# Patient Record
Sex: MALE | Race: WHITE | NOT HISPANIC OR LATINO
[De-identification: names, ages, dates, MRNs, and addresses within clinical notes are randomized per-mention and may not be internally consistent; named-entity substitution may affect disease eponyms.]

---

## 2019-01-08 ENCOUNTER — APPOINTMENT (OUTPATIENT)
Dept: DERMATOLOGY | Facility: CLINIC | Age: 64
End: 2019-01-08
Payer: COMMERCIAL

## 2019-01-08 DIAGNOSIS — Z78.9 OTHER SPECIFIED HEALTH STATUS: ICD-10-CM

## 2019-01-08 DIAGNOSIS — L24.5 IRRITANT CONTACT DERMATITIS DUE TO OTHER CHEMICAL PRODUCTS: ICD-10-CM

## 2019-01-08 DIAGNOSIS — Z86.79 PERSONAL HISTORY OF OTHER DISEASES OF THE CIRCULATORY SYSTEM: ICD-10-CM

## 2019-01-08 DIAGNOSIS — Z85.828 PERSONAL HISTORY OF OTHER MALIGNANT NEOPLASM OF SKIN: ICD-10-CM

## 2019-01-08 PROBLEM — Z00.00 ENCOUNTER FOR PREVENTIVE HEALTH EXAMINATION: Status: ACTIVE | Noted: 2019-01-08

## 2019-01-08 PROCEDURE — 99213 OFFICE O/P EST LOW 20 MIN: CPT

## 2019-01-08 RX ORDER — WARFARIN 5 MG/1
5 TABLET ORAL
Refills: 0 | Status: ACTIVE | COMMUNITY

## 2019-01-08 RX ORDER — HYDROCORTISONE 25 MG/G
2.5 CREAM TOPICAL
Qty: 60 | Refills: 0 | Status: ACTIVE | COMMUNITY
Start: 2018-07-07

## 2019-01-08 RX ORDER — TADALAFIL 20 MG/1
20 TABLET, FILM COATED ORAL
Qty: 18 | Refills: 0 | Status: ACTIVE | COMMUNITY
Start: 2018-08-15

## 2019-01-08 RX ORDER — FLUOCINONIDE 0.5 MG/G
0.05 CREAM TOPICAL
Qty: 1 | Refills: 1 | Status: ACTIVE | COMMUNITY
Start: 2019-01-08 | End: 1900-01-01

## 2019-01-08 NOTE — PHYSICAL EXAM
[Alert] : alert [Oriented x 3] : ~L oriented x 3 [Well Nourished] : well nourished [FreeTextEntry3] : The following areas were examined and no significant abnormalities were seen except as noted below:\par \par Type II skin\par \par scalp, face, eyelids, nose, lips, ears, neck, chest, abdomen, back,groin, buttocks, right arm, left arm, right hand, left hand,\par right  leg, left leg, right foot, left foot\par \par Face: bright erythematous patches on the inner cheeks, right greater than the left with one small bulla on the upper bridge of the nose\par Multiplet almost confluent small pustules present on the right inner cheek with a shallow crusted erosion\par Right axilla and right lower back: 8X4 mm brown verrucous plaques\par Multiple small tan macules present on the arms, chest, and upper back\par Left lower medial leg: Circular hypopigmented patch-no evidence of recurrence of the basal cell carcinoma at the site\par \par No suspicious lesions seen

## 2019-01-08 NOTE — HISTORY OF PRESENT ILLNESS
[FreeTextEntry1] : rash on face [de-identified] : Followup visit for a 63-year-old white male status post 2 night usage of 1-year-old Picato gel 0.015% to the inner cheeks and nose for actinic keratoses.  Rash is mildly sore.\par \par Patient also presents for evaluation of growths. Particularly concerned about lesions in the right axilla and right back. Has a history of basal cell carcinoma treated in the past.

## 2019-05-21 ENCOUNTER — APPOINTMENT (OUTPATIENT)
Dept: DERMATOLOGY | Facility: CLINIC | Age: 64
End: 2019-05-21
Payer: COMMERCIAL

## 2019-05-21 VITALS — HEIGHT: 72 IN | BODY MASS INDEX: 27.09 KG/M2 | WEIGHT: 200 LBS

## 2019-05-21 DIAGNOSIS — D22.5 MELANOCYTIC NEVI OF TRUNK: ICD-10-CM

## 2019-05-21 PROCEDURE — 17000 DESTRUCT PREMALG LESION: CPT

## 2019-05-21 PROCEDURE — 99213 OFFICE O/P EST LOW 20 MIN: CPT | Mod: 25

## 2019-05-21 RX ORDER — DOXYCYCLINE 40 MG/1
40 CAPSULE ORAL
Qty: 30 | Refills: 1 | Status: ACTIVE | COMMUNITY
Start: 2019-05-21 | End: 1900-01-01

## 2019-05-21 NOTE — HISTORY OF PRESENT ILLNESS
[FreeTextEntry1] : Evaluation of growths [de-identified] : Followup visit for 63-year-old white male last seen by me on January 8, 2019 presenting for evaluation of growths.  Particularly concerned about increased asyptomatic redness of the cheeks.  Patient is status post use of Picato gel to both cheeks about 5 months ago. Also, use fluocinonide cream 0.05%for irritation.  Currently using no treatment.\par Patient has history of basal cell carcinoma treated in the past.

## 2019-05-21 NOTE — PHYSICAL EXAM
[Alert] : alert [Oriented x 3] : ~L oriented x 3 [Well Nourished] : well nourished [FreeTextEntry3] : The following areas were examined and no significant abnormalities were seen except as noted below:\par \par Type II skin\par \par scalp, face, eyelids, nose, lips, ears, neck, chest, abdomen, back,groin, buttocks, right arm, left arm, right hand, left hand,\par right  leg, left leg, right foot, left foot\par \par Inner cheeks: Mild 2- 3 mm, pink papules with mild underlying erythema\par Right nose: 15 x 12 mm, erythematous, scaly patch\par Trunk:  Mild to moderate small, brown, verrucous papule/plaques.\par Moderate 2- 4 mm, dark brown macules\par Right flank: 10 x 8 mm smooth brown plaque-not suspicious on dermoscopy\par Left lower medial leg: Circular hyperpigmented patch-no evidence of recurrence of previously treated basal cell carcinoma\par \par No other suspicious lesions seen

## 2019-05-21 NOTE — ASSESSMENT
[FreeTextEntry1] : Rosacea on cheeks\par Large actinic keratosis on right nose.\par Seborrheic keratoses on trunk and elsewhere.\par Melanocytic nevi on trunk including the right flank

## 2019-06-24 ENCOUNTER — APPOINTMENT (OUTPATIENT)
Dept: DERMATOLOGY | Facility: CLINIC | Age: 64
End: 2019-06-24
Payer: COMMERCIAL

## 2019-06-24 PROCEDURE — 99213 OFFICE O/P EST LOW 20 MIN: CPT

## 2019-06-24 RX ORDER — DOXYCYCLINE HYCLATE 50 MG/1
50 CAPSULE ORAL
Qty: 30 | Refills: 0 | Status: COMPLETED | COMMUNITY
Start: 2019-05-21

## 2019-06-24 NOTE — HISTORY OF PRESENT ILLNESS
[FreeTextEntry1] : Rosacea [de-identified] : Followup visit for 63-year-old white male last seen by me on May 24, 2019, with rosacea. Started on doxycycline (uncertain if patient is on the 40 mg extended release or 50 mg capsule) and metronidazole cream 0.75% b.i.d.\par Pimples on face have improved , but patient complains of increased facial erythema when exposed to sun.\par Patient also concerned about a persistent lesion on the left forearm.

## 2019-06-24 NOTE — PHYSICAL EXAM
[Alert] : alert [Oriented x 3] : ~L oriented x 3 [Well Nourished] : well nourished [Nose] : Nose [Eyelids] : Eyelids [Ears] : Ears [Lips] : Lips [FreeTextEntry3] : Face: Essentially no papules.\par Left mid cheek: 3 x 3 mm, dull, erythematous smooth papule\par Rare small, faint papules present on the right cheek\par Nose: Clear\par left mid forearm: 3 x 3 mm, slightly elevated pink papule with a surrounding ring of purpura except southwest

## 2019-06-25 ENCOUNTER — CLINICAL ADVICE (OUTPATIENT)
Age: 64
End: 2019-06-25

## 2019-07-30 ENCOUNTER — APPOINTMENT (OUTPATIENT)
Dept: DERMATOLOGY | Facility: CLINIC | Age: 64
End: 2019-07-30
Payer: COMMERCIAL

## 2019-07-30 PROCEDURE — 99213 OFFICE O/P EST LOW 20 MIN: CPT

## 2019-07-30 NOTE — HISTORY OF PRESENT ILLNESS
[FreeTextEntry1] : Rosacea [de-identified] : Followup visit for 64-year-old white male last seen by me on June 24, 2019 with rosacea. Currently off of p.o. doxycycline. Currently using metronidazole cream 0.75% b.i.d.\par Note-patient picked up a green undercooked but has yet to try using it.

## 2019-07-30 NOTE — PHYSICAL EXAM
[Alert] : alert [Oriented x 3] : ~L oriented x 3 [Well Nourished] : well nourished [FreeTextEntry3] : Face: Mild small barely elevated papules present on the inner cheeks, especially left\par Mild to moderate ill-defined telangiectatic erythema present on both inner cheeks

## 2019-10-01 ENCOUNTER — APPOINTMENT (OUTPATIENT)
Dept: DERMATOLOGY | Facility: CLINIC | Age: 64
End: 2019-10-01
Payer: COMMERCIAL

## 2019-10-01 PROCEDURE — 99213 OFFICE O/P EST LOW 20 MIN: CPT

## 2019-10-01 NOTE — HISTORY OF PRESENT ILLNESS
[FreeTextEntry1] : Rosacea [de-identified] : Followup visit for 64-year-old white male last seen by me on July 30, 2019 with rosacea.\par Currently being treated with doxycycline 50 mg p.o. once a day, metronidazole cream 0.75% b.i.d.  Stop the doxycycline one week ago and starting to flare.\par Patient also concerned about certain growths.

## 2019-10-01 NOTE — PHYSICAL EXAM
[Alert] : alert [Oriented x 3] : ~L oriented x 3 [Well Nourished] : well nourished [Eyelids] : Eyelids [Ears] : Ears [FreeTextEntry3] : Face: Rare papules and pustules present on the inner cheeks with mild underlying erythema and focal crusts present on the left nose\par Right anterior shoulder: 6 x 4 mm brown verrucous plaque\par Right forearm: 7 x 5 mm thin brown verrucous plaque\par dorsum left hand: 5X5mm pink papule

## 2019-11-04 ENCOUNTER — APPOINTMENT (OUTPATIENT)
Dept: DERMATOLOGY | Facility: CLINIC | Age: 64
End: 2019-11-04

## 2020-10-27 ENCOUNTER — APPOINTMENT (OUTPATIENT)
Dept: DERMATOLOGY | Facility: CLINIC | Age: 65
End: 2020-10-27
Payer: MEDICARE

## 2020-10-27 DIAGNOSIS — L81.4 OTHER MELANIN HYPERPIGMENTATION: ICD-10-CM

## 2020-10-27 PROCEDURE — 99072 ADDL SUPL MATRL&STAF TM PHE: CPT

## 2020-10-27 PROCEDURE — 17000 DESTRUCT PREMALG LESION: CPT

## 2020-10-27 PROCEDURE — 17003 DESTRUCT PREMALG LES 2-14: CPT

## 2020-10-27 PROCEDURE — 99213 OFFICE O/P EST LOW 20 MIN: CPT | Mod: 25

## 2020-10-27 NOTE — ASSESSMENT
[FreeTextEntry1] : Rosacea under excellent control\par Actinic keratoses on face\par Incipient seborrheic keratosis on left arm

## 2020-10-27 NOTE — PHYSICAL EXAM
[Alert] : alert [Oriented x 3] : ~L oriented x 3 [Well Nourished] : well nourished [FreeTextEntry3] : The following areas were examined and no significant abnormalities were seen except as noted below:\par \par Type II skin\par \par scalp, face, eyelids, nose, lips, ears, neck, chest, abdomen, back,groin, buttocks, right arm, left arm, right hand, left hand,\par right  leg, left leg, right foot, left foot\par \par Left upper forehead: 6 x 3 mm pink scaly patch\par Right upper inner cheek: 8 x 2 mm pink scaly patch\par Face: Practically clear of rosacea\par Left bicep: 5 x 4 mm tan verrucous papule\par Shoulders: Moderate 2 mm tan macules\par Back: Mild to moderate brown verrucous plaques, especially lower back\par \par No other suspicious lesions seen\par \par \par \par No other suspicious lesions seen

## 2020-11-11 ENCOUNTER — TRANSCRIPTION ENCOUNTER (OUTPATIENT)
Age: 65
End: 2020-11-11

## 2020-11-12 ENCOUNTER — TRANSCRIPTION ENCOUNTER (OUTPATIENT)
Age: 65
End: 2020-11-12

## 2021-02-26 ENCOUNTER — NON-APPOINTMENT (OUTPATIENT)
Age: 66
End: 2021-02-26

## 2021-03-17 ENCOUNTER — APPOINTMENT (OUTPATIENT)
Dept: DERMATOLOGY | Facility: CLINIC | Age: 66
End: 2021-03-17
Payer: MEDICARE

## 2021-03-17 DIAGNOSIS — L30.8 OTHER SPECIFIED DERMATITIS: ICD-10-CM

## 2021-03-17 DIAGNOSIS — L73.9 FOLLICULAR DISORDER, UNSPECIFIED: ICD-10-CM

## 2021-03-17 PROCEDURE — 99213 OFFICE O/P EST LOW 20 MIN: CPT

## 2021-03-17 PROCEDURE — 99072 ADDL SUPL MATRL&STAF TM PHE: CPT

## 2021-03-17 RX ORDER — TRIAMCINOLONE ACETONIDE 1 MG/G
0.1 CREAM TOPICAL
Qty: 1 | Refills: 1 | Status: ACTIVE | COMMUNITY
Start: 2021-03-17 | End: 1900-01-01

## 2021-03-17 NOTE — PHYSICAL EXAM
[Alert] : alert [Oriented x 3] : ~L oriented x 3 [Well Nourished] : well nourished [FreeTextEntry3] : Type II skin\par \par Patient wearing a facemask\par \par Face: Mild to moderate ill-defined erythema on the cheeks\par Mild to moderate thin erythematous scaly patches on nose\par No active papules seen\par Left forearm: 2 small thin pink scaly patches\par 2 smaller similar lesions present on the right forearm\par Arms: Mild ill-defined follicular erythematous papules

## 2021-03-17 NOTE — HISTORY OF PRESENT ILLNESS
[FreeTextEntry1] : Spots on arms [de-identified] : Followup visit for 65-year-old white male last seen by me on October 27, 2020, with a one-month history of mostly asymptomatic "spots" on both forearms. Patient also complains of "pimples" on the arms.  No previous treatment. No previous episodes.\par Patient does have history of actinic keratoses treated with cryosurgery in the past\par Patient also has history of rosacea. Treated with doxycycline 50 mg p.o. every other day and metronidazole cream 0.75% once a day.

## 2021-03-17 NOTE — ASSESSMENT
[FreeTextEntry1] : Lesions on forearms consistent with mild asteatotic dermatitis - ?? Actinic keratoses\par Mild folliculitis/keratosis pilaris on arms\par Actinic keratoses on face\par No evidence of active rosacea

## 2021-08-18 ENCOUNTER — NON-APPOINTMENT (OUTPATIENT)
Age: 66
End: 2021-08-18

## 2021-08-18 RX ORDER — METRONIDAZOLE 7.5 MG/G
0.75 CREAM TOPICAL
Qty: 1 | Refills: 3 | Status: ACTIVE | COMMUNITY
Start: 2019-05-21 | End: 1900-01-01

## 2021-09-28 ENCOUNTER — APPOINTMENT (OUTPATIENT)
Dept: DERMATOLOGY | Facility: CLINIC | Age: 66
End: 2021-09-28
Payer: COMMERCIAL

## 2021-09-28 DIAGNOSIS — D48.5 NEOPLASM OF UNCERTAIN BEHAVIOR OF SKIN: ICD-10-CM

## 2021-09-28 DIAGNOSIS — L71.8 OTHER ROSACEA: ICD-10-CM

## 2021-09-28 DIAGNOSIS — L82.1 OTHER SEBORRHEIC KERATOSIS: ICD-10-CM

## 2021-09-28 DIAGNOSIS — L57.0 ACTINIC KERATOSIS: ICD-10-CM

## 2021-09-28 DIAGNOSIS — Z85.828 PERSONAL HISTORY OF OTHER MALIGNANT NEOPLASM OF SKIN: ICD-10-CM

## 2021-09-28 PROCEDURE — 11102 TANGNTL BX SKIN SINGLE LES: CPT

## 2021-09-28 PROCEDURE — 99072 ADDL SUPL MATRL&STAF TM PHE: CPT

## 2021-09-28 PROCEDURE — 99213 OFFICE O/P EST LOW 20 MIN: CPT | Mod: 25

## 2021-09-28 RX ORDER — DOXYCYCLINE HYCLATE 50 MG/1
50 CAPSULE ORAL
Qty: 90 | Refills: 3 | Status: ACTIVE | COMMUNITY
Start: 2019-10-01 | End: 1900-01-01

## 2021-09-28 RX ORDER — METOPROLOL SUCCINATE 25 MG/1
25 TABLET, EXTENDED RELEASE ORAL
Qty: 90 | Refills: 0 | Status: DISCONTINUED | COMMUNITY
Start: 2018-12-12 | End: 2021-09-28

## 2021-09-28 NOTE — HISTORY OF PRESENT ILLNESS
[FreeTextEntry1] : Evaluation of growths [de-identified] : Followup visit for 66-year-old white male last seen by me on March 17, 2021, for evaluation of growths.\par Patient has history of basal cell carcinoma treated in the past.\par Patient also has a history of rosacea currently being treated with metronidazole cream 0.75% once a day.  Aso takes doxycycline 50 mg p.o. every other day.

## 2021-09-28 NOTE — ASSESSMENT
[FreeTextEntry1] : Rosacea: Currently under excellent control\par Actinic keratoses on right nose and below left eye and tops of the ears\par ? Seborrheic keratosis inflamed, rule out incipient squamous cell carcinoma on the chest

## 2021-09-28 NOTE — PHYSICAL EXAM
[Alert] : alert [Oriented x 3] : ~L oriented x 3 [Well Nourished] : well nourished [FreeTextEntry3] : The following areas were examined and no significant abnormalities were seen except as noted below:\par \par Type II skin\par \par scalp, face, eyelids, nose, lips, ears, neck, chest, abdomen, back,groin, buttocks, right arm, left arm, right hand, left hand,\par right  leg, left leg, right foot, left foot\par \par Face: Mild small pink scaly macules especially right nose around hypopigmented scar and one below the left eye\par No evidence of active rosacea\par Ears: Few 1 mm scaly macules present on the top of the helices\par Midchest: 4 x 4 mm pink scaly papule in center of a brown patch\par Trunk: Mild to moderate small brown verrucous papules\par \par No other suspicious lesions seen

## 2021-10-06 ENCOUNTER — NON-APPOINTMENT (OUTPATIENT)
Age: 66
End: 2021-10-06

## 2021-10-12 ENCOUNTER — APPOINTMENT (OUTPATIENT)
Dept: DERMATOLOGY | Facility: CLINIC | Age: 66
End: 2021-10-12
Payer: COMMERCIAL

## 2021-10-12 DIAGNOSIS — D04.5 CARCINOMA IN SITU OF SKIN OF TRUNK: ICD-10-CM

## 2021-10-12 DIAGNOSIS — Z86.007 PERSONAL HISTORY OF IN-SITU NEOPLASM OF SKIN: ICD-10-CM

## 2021-10-12 PROCEDURE — 17261 DSTRJ MAL LES T/A/L .6-1.0CM: CPT

## 2021-10-12 PROCEDURE — 99072 ADDL SUPL MATRL&STAF TM PHE: CPT

## 2021-10-12 NOTE — HISTORY OF PRESENT ILLNESS
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

## 2021-10-12 NOTE — PHYSICAL EXAM
[Alert] : alert [Oriented x 3] : ~L oriented x 3 [Well Nourished] : well nourished [FreeTextEntry3] : Midchest: Wound healing well without evidence of infection

## 2022-04-04 ENCOUNTER — TRANSCRIPTION ENCOUNTER (OUTPATIENT)
Age: 67
End: 2022-04-04

## 2022-04-07 ENCOUNTER — TRANSCRIPTION ENCOUNTER (OUTPATIENT)
Age: 67
End: 2022-04-07

## 2022-04-26 ENCOUNTER — APPOINTMENT (OUTPATIENT)
Dept: DERMATOLOGY | Facility: CLINIC | Age: 67
End: 2022-04-26

## 2022-09-13 ENCOUNTER — NON-APPOINTMENT (OUTPATIENT)
Age: 67
End: 2022-09-13

## 2022-09-26 ENCOUNTER — NON-APPOINTMENT (OUTPATIENT)
Age: 67
End: 2022-09-26

## 2022-09-29 ENCOUNTER — APPOINTMENT (OUTPATIENT)
Dept: DERMATOLOGY | Facility: CLINIC | Age: 67
End: 2022-09-29

## 2022-11-02 ENCOUNTER — APPOINTMENT (OUTPATIENT)
Dept: ORTHOPEDIC SURGERY | Facility: CLINIC | Age: 67
End: 2022-11-02

## 2022-11-02 DIAGNOSIS — Z86.79 PERSONAL HISTORY OF OTHER DISEASES OF THE CIRCULATORY SYSTEM: ICD-10-CM

## 2022-11-02 PROCEDURE — 99203 OFFICE O/P NEW LOW 30 MIN: CPT | Mod: 25

## 2022-11-02 PROCEDURE — 20610 DRAIN/INJ JOINT/BURSA W/O US: CPT | Mod: LT

## 2022-11-02 NOTE — HISTORY OF PRESENT ILLNESS
[de-identified] : Patient is here today for the left hip. Patient had an x-ray at . Patient has tried PT and Mobic with good relief.  Patient notes pain lateral hip buttock and into the thigh.  He denies any injury.  Patient is on Coumadin for A fib for many  yrs.  He denies any groin pain.  He tried Mobic without any relief.

## 2022-11-02 NOTE — PROCEDURE
[Left] : of the left [Greater Trochanteric Bursa] : greater trochanteric bursa [___ cc    6mg] :  Betamethasone (Celestone) ~Vcc of 6mg [___ cc    1%] : Lidocaine ~Vcc of 1%  [] : Patient tolerated procedure well [Call if redness, pain or fever occur] : call if redness, pain or fever occur [Apply ice for 15min out of every hour for the next 12-24 hours as tolerated] : apply ice for 15 minutes out of every hour for the next 12-24 hours as tolerated [Previous OTC use and PT nontherapeutic] : patient has tried OTC's including aspirin, Ibuprofen, Aleve, etc or prescription NSAIDS, and/or exercises at home and/or physical therapy without satisfactory response [Patient had decreased mobility in the joint] : patient had decreased mobility in the joint [Risks, benefits, alternatives discussed / Verbal consent obtained] : the risks benefits, and alternatives have been discussed, and verbal consent was obtained

## 2022-11-02 NOTE — DISCUSSION/SUMMARY
[de-identified] : Options were discussed today including oral anti-inflammatories, Physical Therapy, Steroid Injection,hylagan injections or Surgery. The patient had time to ask questions of the different treatment options, including doing nothing and just observing for a finite period of time and re-evaluating in the future. Plan is for CSI, f/u in 1 month. \par \par The following information has been documented by Karena Obregon acting as a scribe.\par Dr. Hudson Attestation\par The documentation recorded by the scribe, in my presence, accurately reflects the service I, Dr. Hudson, personally performed, and the decisions made by me with my edits as appropriate.\par \par

## 2022-11-02 NOTE — PHYSICAL EXAM
[Left] : left hip with pelvis [Outside films reviewed] : Outside films reviewed [There are no fractures, subluxations or dislocations. No significant abnormalities are seen] : There are no fractures, subluxations or dislocations. No significant abnormalities are seen [de-identified] : Constitutional: The patient appears well developed, well nourished. Examination of patients ability to communicate functionally was normal.  \par \par  \par \par Neurologic: Coordination is normal. Alert and oriented to time, place and person. No evidence of mood disorder, calm affect.  \par \par  \par \par     LEFT  HIP/THIGH: Inspection of the hip/thigh is as follows: Inspection shows no swelling, no ecchymosis, no erythema, no rashes, no masses and no enlarged lymph nodes.  \par \par  \par \par Palpation of the hip/thigh is as follows: greater trochanter tenderness. no groin tenderness.  \par \par  \par \par Range of motion of the hip is as follows in degrees:   \par \par  \par \par Flexion: 100  \par \par Abduction:  20  \par \par External rotation:  40  \par \par Internal rotation:  30   \par \par  \par \par Strength testing of the hip/thigh is as follows:  \par \par  \par \par Hip flexion strength:  5/5,  \par \par Hip extension strength:  5/5  \par \par Hip abduction strength:   5/5   \par \par Hip adduction strength:   5/5.  \par \par  \par \par Special tests of the hip/thigh is as follows: pain in bursa with internal rotation and pain bursa with external rotation.  \par \par  \par \par Neurological testing of the hip/thigh is as follows: motor exam 5/5 throughout, light touch intact throughout and no focal motor defecits.  \par \par  \par \par Gait and function is as follows: non-antalgic gait. \par \par   [FreeTextEntry9] : QUENTIN

## 2022-11-28 ENCOUNTER — APPOINTMENT (OUTPATIENT)
Dept: ORTHOPEDIC SURGERY | Facility: CLINIC | Age: 67
End: 2022-11-28

## 2022-11-28 PROCEDURE — 72100 X-RAY EXAM L-S SPINE 2/3 VWS: CPT

## 2022-11-28 PROCEDURE — 99213 OFFICE O/P EST LOW 20 MIN: CPT | Mod: 25

## 2022-11-28 NOTE — HISTORY OF PRESENT ILLNESS
[de-identified] : Patient is here today for the left hip. Patient had an x-ray at . Patient has tried PT and Mobic with good relief.  Patient notes pain lateral hip buttock and into the thigh.  He denies any injury.  Patient is on Coumadin for A fib for many  yrs.  He denies any groin pain.  He tried Mobic without any relief. \par \par Patient states the Left lateral hip pain has resolved since his CSI Left troch bursa but he still notes Left sided low back pain and Left thigh aching worse with standing for longer periods of time. he has hx of herniated discs.  Denies any paresthesias or weakness to the B/L LE.

## 2022-11-28 NOTE — PHYSICAL EXAM
[Left] : left hip with pelvis [Outside films reviewed] : Outside films reviewed [There are no fractures, subluxations or dislocations. No significant abnormalities are seen] : There are no fractures, subluxations or dislocations. No significant abnormalities are seen [FreeTextEntry9] : QUENTIN [Facet arthropathy] : Facet arthropathy [Disc space narrowing] : Disc space narrowing [Scoliosis] : Scoliosis [de-identified] : Constitutional: The patient appears well developed, well nourished. Examination of patients ability to communicate functionally was normal.  \par \par  \par \par Neurologic: Coordination is normal. Alert and oriented to time, place and person. No evidence of mood disorder, calm affect.  \par \par  \par \par     LEFT  HIP/THIGH: Inspection of the hip/thigh is as follows: Inspection shows no swelling, no ecchymosis, no erythema, no rashes, no masses and no enlarged lymph nodes.  \par \par  \par \par Palpation of the hip/thigh is as follows:  NO greater trochanter tenderness. no groin tenderness.  \par \par  \par \par Range of motion of the hip is as follows in degrees:   \par \par  \par \par Flexion: 100  \par \par Abduction:  20  \par \par External rotation:  40  \par \par Internal rotation:  30   \par \par  \par \par Strength testing of the hip/thigh is as follows:  \par \par  \par \par Hip flexion strength:  5/5,  \par \par Hip extension strength:  5/5  \par \par Hip abduction strength:   5/5   \par \par Hip adduction strength:   5/5.  \par \par  \par \par Special tests of the hip/thigh is as follows: pain in bursa with internal rotation and pain bursa with external rotation.  \par \par  \par \par Neurological testing of the hip/thigh is as follows: motor exam 5/5 throughout, light touch intact throughout and no focal motor defecits.  \par \par  \par \par Gait and function is as follows: non-antalgic gait. \par \par Q/H/AT?GS/EHL/FHL 5/5 Pat/ankle reflexes are 2+ b/l sensation b/l LE WNL.  neg SLR test. \par \par

## 2022-11-28 NOTE — DISCUSSION/SUMMARY
[de-identified] : Options were discussed today including oral anti-inflammatories, Physical Therapy, MRI, Steroid Injection,hylagan injections or Surgery. The patient had time to ask questions of the different treatment options, including doing nothing and just observing for a finite period of time and re-evaluating in the future \par Patient sent for an MRI of lumbar spine. Pt will follow up after MRI for further eval and results. Pt will f/u with Dr Fernandez\par \par Entered by Jenna Gonzales acting as scribe. \par Dr. Hudson Attestation\par The documentation recorded by the scribe, in my presence, accurately reflects the service I, Dr. Hudson, personally performed, and the decisions made by me with my edits as appropriate.\par

## 2022-11-29 ENCOUNTER — RESULT REVIEW (OUTPATIENT)
Age: 67
End: 2022-11-29

## 2022-12-15 ENCOUNTER — APPOINTMENT (OUTPATIENT)
Dept: ORTHOPEDIC SURGERY | Facility: CLINIC | Age: 67
End: 2022-12-15

## 2022-12-15 DIAGNOSIS — M70.62 TROCHANTERIC BURSITIS, LEFT HIP: ICD-10-CM

## 2022-12-15 PROCEDURE — 99213 OFFICE O/P EST LOW 20 MIN: CPT

## 2022-12-15 NOTE — DATA REVIEWED
[MRI] : MRI [Lumbar Spine] : lumbar spine [Report was reviewed and noted in the chart] : The report was reviewed and noted in the chart [I independently reviewed and interpreted images and report] : I independently reviewed and interpreted images and report [FreeTextEntry1] : Bilateral foraminal stenosis L3-4 \par Moderate to severe central stenosis L4-5 \par Left lateral recess stenosis L5-S1

## 2022-12-15 NOTE — ASSESSMENT
[FreeTextEntry1] : Bilateral foraminal stenosis L3-4 \par Moderate to severe central stenosis L4-5 \par Left lateral recess stenosis L5-S1 \par \par Patient will begin physical therapy. \par \par Referral to pain management for injections, follow up 2 weeks after injection. \par \par Recommend: - NSAID - Heating pad - Muscle relaxer - Core strengthening exercise - Hamstring stretching exercise Patient is given back rehabilitation exercise book. \par \par Follow up in 2 months

## 2022-12-15 NOTE — HISTORY OF PRESENT ILLNESS
[de-identified] : Patient presents today with constant pain in left side of lower back radiating into buttock and thigh. Pt has seen [facility] and was treated for hip bursitis. Reports pain is worse with prolonged sitting and when rising to a standing position. Some improvement with walking short term but states pain increases with prolonged walking. Denies numbness or tingling in LEs. Pt has MRI per [facility] at . Reports going to PT years in the past and taking Mobic for sciatic pain.

## 2023-01-08 ENCOUNTER — FORM ENCOUNTER (OUTPATIENT)
Age: 68
End: 2023-01-08

## 2023-02-09 ENCOUNTER — APPOINTMENT (OUTPATIENT)
Dept: ORTHOPEDIC SURGERY | Facility: CLINIC | Age: 68
End: 2023-02-09
Payer: MEDICARE

## 2023-02-09 VITALS — HEIGHT: 72 IN | WEIGHT: 200 LBS | BODY MASS INDEX: 27.09 KG/M2

## 2023-02-09 DIAGNOSIS — M51.37 OTHER INTERVERTEBRAL DISC DEGENERATION, LUMBOSACRAL REGION: ICD-10-CM

## 2023-02-09 DIAGNOSIS — M54.16 RADICULOPATHY, LUMBAR REGION: ICD-10-CM

## 2023-02-09 DIAGNOSIS — M53.3 SACROCOCCYGEAL DISORDERS, NOT ELSEWHERE CLASSIFIED: ICD-10-CM

## 2023-02-09 DIAGNOSIS — M48.061 SPINAL STENOSIS, LUMBAR REGION WITHOUT NEUROGENIC CLAUDICATION: ICD-10-CM

## 2023-02-09 DIAGNOSIS — M47.817 SPONDYLOSIS W/OUT MYELOPATHY OR RADICULOPATHY, LUMBOSACRAL REGION: ICD-10-CM

## 2023-02-09 DIAGNOSIS — M51.36 OTHER INTERVERTEBRAL DISC DEGENERATION, LUMBAR REGION: ICD-10-CM

## 2023-02-09 PROCEDURE — 99213 OFFICE O/P EST LOW 20 MIN: CPT

## 2023-02-09 NOTE — ASSESSMENT
[FreeTextEntry1] : Bilateral foraminal stenosis L3-4 \par Moderate to severe central stenosis L4-5 \par Left lateral recess stenosis L5-S1 \par \par Begin HEP\par \par Recommend: - NSAID - Heating pad - Muscle relaxer - Core strengthening exercise - Hamstring stretching exercise Patient is given back rehabilitation exercise book. \par \par Follow up in PRN

## 2023-02-09 NOTE — HISTORY OF PRESENT ILLNESS
[de-identified] : Follow up lumbar spine. Doing well, feels improvement since going to PT. Going to PT 2x a week with some relief. Denies takign any pain medication.

## 2023-04-16 NOTE — HISTORY OF PRESENT ILLNESS
[FreeTextEntry1] : Evaluation of growths [de-identified] : Followup visit for 65-year-old white male presenting for evaluation of growths.  Particularly concerned about a lesion in the left arm. No history of skin cancer.\par Patient also has a history of rosacea. Treated with doxycycline 50 mg p.o. every other day and metronidazole cream 0.75% qd. normal/soft/nontender/nondistended/normal active bowel sounds

## 2024-01-23 ENCOUNTER — APPOINTMENT (OUTPATIENT)
Dept: SURGERY | Facility: CLINIC | Age: 69
End: 2024-01-23
Payer: MEDICARE

## 2024-01-23 DIAGNOSIS — K76.89 OTHER SPECIFIED DISEASES OF LIVER: ICD-10-CM

## 2024-01-23 PROCEDURE — 99204 OFFICE O/P NEW MOD 45 MIN: CPT

## 2024-01-24 LAB
CANCER AG19-9 SERPL-ACNC: 9 U/ML
CEA SERPL-MCNC: 1.3 NG/ML

## 2024-01-25 PROBLEM — K76.89 LIVER CYST: Status: ACTIVE | Noted: 2024-01-23

## 2024-02-01 ENCOUNTER — NON-APPOINTMENT (OUTPATIENT)
Age: 69
End: 2024-02-01

## 2024-11-29 ENCOUNTER — OUTPATIENT (OUTPATIENT)
Dept: OUTPATIENT SERVICES | Facility: HOSPITAL | Age: 69
LOS: 1 days | End: 2024-11-29
Payer: MEDICARE

## 2024-11-29 ENCOUNTER — APPOINTMENT (OUTPATIENT)
Dept: ULTRASOUND IMAGING | Facility: CLINIC | Age: 69
End: 2024-11-29
Payer: MEDICARE

## 2024-11-29 DIAGNOSIS — K76.89 OTHER SPECIFIED DISEASES OF LIVER: ICD-10-CM

## 2024-11-29 PROCEDURE — 76705 ECHO EXAM OF ABDOMEN: CPT

## 2024-11-29 PROCEDURE — 76705 ECHO EXAM OF ABDOMEN: CPT | Mod: 26
